# Patient Record
Sex: MALE | Race: BLACK OR AFRICAN AMERICAN | NOT HISPANIC OR LATINO | Employment: OTHER | URBAN - METROPOLITAN AREA
[De-identification: names, ages, dates, MRNs, and addresses within clinical notes are randomized per-mention and may not be internally consistent; named-entity substitution may affect disease eponyms.]

---

## 2024-04-29 ENCOUNTER — HOSPITAL ENCOUNTER (EMERGENCY)
Facility: HOSPITAL | Age: 59
Discharge: HOME | End: 2024-04-29
Attending: FAMILY MEDICINE
Payer: MEDICARE

## 2024-04-29 VITALS
TEMPERATURE: 98 F | OXYGEN SATURATION: 97 % | WEIGHT: 143.75 LBS | RESPIRATION RATE: 16 BRPM | SYSTOLIC BLOOD PRESSURE: 183 MMHG | DIASTOLIC BLOOD PRESSURE: 94 MMHG | HEART RATE: 100 BPM

## 2024-04-29 DIAGNOSIS — M70.21 OLECRANON BURSITIS OF RIGHT ELBOW: Primary | ICD-10-CM

## 2024-04-29 DIAGNOSIS — Z76.0 MEDICATION REFILL: ICD-10-CM

## 2024-04-29 PROCEDURE — 99281 EMR DPT VST MAYX REQ PHY/QHP: CPT

## 2024-04-29 RX ORDER — AMLODIPINE BESYLATE 5 MG/1
5 TABLET ORAL EVERY MORNING
COMMUNITY
Start: 2023-11-30 | End: 2024-04-29

## 2024-04-29 RX ORDER — AMLODIPINE BESYLATE 5 MG/1
5 TABLET ORAL DAILY
Qty: 30 TABLET | Refills: 0 | Status: SHIPPED | OUTPATIENT
Start: 2024-04-29

## 2024-04-29 RX ORDER — DICLOFENAC SODIUM 50 MG/1
50 TABLET, DELAYED RELEASE ORAL 2 TIMES DAILY
Qty: 10 TABLET | Refills: 0 | Status: SHIPPED | OUTPATIENT
Start: 2024-04-29 | End: 2024-05-04

## 2024-04-30 NOTE — ED PROVIDER NOTES
Encounter Date: 4/29/2024       History     Chief Complaint   Patient presents with    Joint Swelling     Right elbow swelling. x2 wks.      58-year-old male presents the emergency department for right elbow pain and swelling.  Patient reports bumping his elbow several weeks ago and has had continued pain and swelling to the area.  Patient denies any fever, chills, chest pain, shortness of breath, back pain, abdominal pain, nausea, vomiting, and all other concerns at this time.    The history is provided by the patient. No  was used.   Patient also reports he needs a refill on his blood pressure medication  Review of patient's allergies indicates:  No Known Allergies  Past Medical History:   Diagnosis Date    Anxiety     Hypertension      No past surgical history on file.  No family history on file.  Social History     Tobacco Use    Smoking status: Every Day   Substance Use Topics    Alcohol use: Yes     Review of Systems   Constitutional:  Negative for fever.   HENT:  Negative for sore throat.    Respiratory:  Negative for shortness of breath.    Cardiovascular:  Negative for chest pain.   Gastrointestinal:  Negative for abdominal pain, nausea and vomiting.   Genitourinary:  Negative for dysuria.   Musculoskeletal:  Positive for joint swelling. Negative for back pain.   Skin:  Negative for rash.   Neurological:  Negative for weakness.   Hematological:  Does not bruise/bleed easily.       Physical Exam     Initial Vitals [04/29/24 1721]   BP Pulse Resp Temp SpO2   (!) 183/94 100 16 97.8 °F (36.6 °C) 97 %      MAP       --         Physical Exam    Nursing note and vitals reviewed.  Constitutional: He appears well-developed and well-nourished. He is not diaphoretic. No distress.   HENT:   Head: Normocephalic and atraumatic.   Eyes: Right eye exhibits no discharge. Left eye exhibits no discharge.   Neck: Neck supple.   Normal range of motion.  Cardiovascular:  Normal rate.            Pulmonary/Chest: No respiratory distress.   Abdominal: He exhibits no distension.   Musculoskeletal:         General: Normal range of motion.      Cervical back: Normal range of motion and neck supple.     Neurological: He is alert and oriented to person, place, and time. He has normal strength.   Skin: Skin is warm and dry.   Area of swelling noted to the right olecranon process without any warmth.  Patient has full range of motion of the elbow.  Pulses are 2+ distally   Psychiatric: He has a normal mood and affect. His behavior is normal. Thought content normal.         ED Course   Procedures  Labs Reviewed - No data to display       Imaging Results    None          Medications - No data to display  Medical Decision Making  Amount and/or Complexity of Data Reviewed  Discussion of management or test interpretation with external provider(s): Right elbow wrapped with Ace wrap by nursing staff.  Patient to follow up with ortho as needed and return to the ER for any worsening or concerns    Risk  Prescription drug management.                                      Clinical Impression:  Final diagnoses:  [M70.21] Olecranon bursitis of right elbow (Primary)  [Z76.0] Medication refill          ED Disposition Condition    Discharge Stable          ED Prescriptions       Medication Sig Dispense Start Date End Date Auth. Provider    diclofenac (VOLTAREN) 50 MG EC tablet Take 1 tablet (50 mg total) by mouth 2 (two) times daily. for 5 days 10 tablet 4/29/2024 5/4/2024 Hilton Loera NP    amLODIPine (NORVASC) 5 MG tablet Take 1 tablet (5 mg total) by mouth once daily. 30 tablet 4/29/2024 -- Hilton Loera NP          Follow-up Information       Follow up With Specialties Details Why Contact [facility]   As needed [id number]  [facility] [address]  [phone]      [facility] - Emergency Dept. Emergency Medicine  If symptoms worsen [address]  [place]  [place] [address]  [phone]             Hilton Loera, PATRICK  04/29/24 2059       Hilton Loera, PATRICK  04/29/24 2059

## 2024-10-02 ENCOUNTER — HOSPITAL ENCOUNTER (EMERGENCY)
Facility: HOSPITAL | Age: 59
Discharge: HOME | End: 2024-10-03
Attending: EMERGENCY MEDICINE
Payer: MEDICARE

## 2024-10-02 DIAGNOSIS — K21.9 GASTROESOPHAGEAL REFLUX DISEASE, UNSPECIFIED WHETHER ESOPHAGITIS PRESENT: Primary | ICD-10-CM

## 2024-10-02 DIAGNOSIS — R79.89 LFT ELEVATION: ICD-10-CM

## 2024-10-02 DIAGNOSIS — R07.9 CHEST PAIN: ICD-10-CM

## 2024-10-02 DIAGNOSIS — E87.6 HYPOKALEMIA: ICD-10-CM

## 2024-10-02 LAB
ALBUMIN SERPL BCP-MCNC: 2.3 G/DL (ref 3.5–5.2)
ALP SERPL-CCNC: 161 U/L (ref 55–135)
ALT SERPL W/O P-5'-P-CCNC: 85 U/L (ref 10–44)
ANION GAP SERPL CALC-SCNC: 12 MMOL/L (ref 8–16)
AST SERPL-CCNC: 106 U/L (ref 10–40)
BASOPHILS # BLD AUTO: 0.05 K/UL (ref 0–0.2)
BASOPHILS NFR BLD: 0.9 % (ref 0–1.9)
BILIRUB SERPL-MCNC: 1.1 MG/DL (ref 0.1–1)
BNP SERPL-MCNC: 18 PG/ML (ref 0–99)
BUN SERPL-MCNC: 11 MG/DL (ref 8–23)
CALCIUM SERPL-MCNC: 7.9 MG/DL (ref 8.7–10.5)
CHLORIDE SERPL-SCNC: 102 MMOL/L (ref 95–110)
CO2 SERPL-SCNC: 22 MMOL/L (ref 23–29)
CREAT SERPL-MCNC: 1.3 MG/DL (ref 0.5–1.4)
DIFFERENTIAL METHOD BLD: ABNORMAL
EOSINOPHIL # BLD AUTO: 0.1 K/UL (ref 0–0.5)
EOSINOPHIL NFR BLD: 1.7 % (ref 0–8)
ERYTHROCYTE [DISTWIDTH] IN BLOOD BY AUTOMATED COUNT: 12.9 % (ref 11.5–14.5)
EST. GFR  (NO RACE VARIABLE): >60 ML/MIN/1.73 M^2
GLUCOSE SERPL-MCNC: 102 MG/DL (ref 70–110)
HCT VFR BLD AUTO: 31.9 % (ref 40–54)
HEP C VIRUS HOLD SPECIMEN: NORMAL
HGB BLD-MCNC: 10.7 G/DL (ref 14–18)
IMM GRANULOCYTES # BLD AUTO: 0.04 K/UL (ref 0–0.04)
IMM GRANULOCYTES NFR BLD AUTO: 0.7 % (ref 0–0.5)
LIPASE SERPL-CCNC: 86 U/L (ref 4–60)
LYMPHOCYTES # BLD AUTO: 1.2 K/UL (ref 1–4.8)
LYMPHOCYTES NFR BLD: 22.8 % (ref 18–48)
MCH RBC QN AUTO: 31.8 PG (ref 27–31)
MCHC RBC AUTO-ENTMCNC: 33.5 G/DL (ref 32–36)
MCV RBC AUTO: 95 FL (ref 82–98)
MONOCYTES # BLD AUTO: 0.9 K/UL (ref 0.3–1)
MONOCYTES NFR BLD: 15.9 % (ref 4–15)
NEUTROPHILS # BLD AUTO: 3.1 K/UL (ref 1.8–7.7)
NEUTROPHILS NFR BLD: 58 % (ref 38–73)
NRBC BLD-RTO: 0 /100 WBC
PLATELET # BLD AUTO: 168 K/UL (ref 150–450)
PMV BLD AUTO: 10.3 FL (ref 9.2–12.9)
POTASSIUM SERPL-SCNC: 2.9 MMOL/L (ref 3.5–5.1)
PROT SERPL-MCNC: 5.2 G/DL (ref 6–8.4)
RBC # BLD AUTO: 3.36 M/UL (ref 4.6–6.2)
SODIUM SERPL-SCNC: 136 MMOL/L (ref 136–145)
TROPONIN I SERPL DL<=0.01 NG/ML-MCNC: <0.006 NG/ML (ref 0–0.03)
WBC # BLD AUTO: 5.4 K/UL (ref 3.9–12.7)

## 2024-10-02 PROCEDURE — 93005 ELECTROCARDIOGRAM TRACING: CPT

## 2024-10-02 PROCEDURE — 83880 ASSAY OF NATRIURETIC PEPTIDE: CPT | Performed by: EMERGENCY MEDICINE

## 2024-10-02 PROCEDURE — 99285 EMERGENCY DEPT VISIT HI MDM: CPT | Mod: 25

## 2024-10-02 PROCEDURE — 25000003 PHARM REV CODE 250: Performed by: EMERGENCY MEDICINE

## 2024-10-02 PROCEDURE — 85025 COMPLETE CBC W/AUTO DIFF WBC: CPT | Performed by: EMERGENCY MEDICINE

## 2024-10-02 PROCEDURE — 93010 ELECTROCARDIOGRAM REPORT: CPT | Mod: ,,, | Performed by: INTERNAL MEDICINE

## 2024-10-02 PROCEDURE — 80053 COMPREHEN METABOLIC PANEL: CPT | Performed by: EMERGENCY MEDICINE

## 2024-10-02 PROCEDURE — 87389 HIV-1 AG W/HIV-1&-2 AB AG IA: CPT | Performed by: EMERGENCY MEDICINE

## 2024-10-02 PROCEDURE — 84484 ASSAY OF TROPONIN QUANT: CPT | Performed by: EMERGENCY MEDICINE

## 2024-10-02 PROCEDURE — 83690 ASSAY OF LIPASE: CPT | Performed by: EMERGENCY MEDICINE

## 2024-10-02 PROCEDURE — 86803 HEPATITIS C AB TEST: CPT | Performed by: EMERGENCY MEDICINE

## 2024-10-02 PROCEDURE — 96374 THER/PROPH/DIAG INJ IV PUSH: CPT

## 2024-10-02 RX ORDER — ATORVASTATIN CALCIUM 40 MG/1
40 TABLET, FILM COATED ORAL DAILY
COMMUNITY
Start: 2024-07-22 | End: 2025-04-18

## 2024-10-02 RX ORDER — GABAPENTIN 300 MG/1
300 CAPSULE ORAL 3 TIMES DAILY
COMMUNITY
Start: 2024-09-13 | End: 2025-09-13

## 2024-10-02 RX ORDER — FAMOTIDINE 10 MG/ML
20 INJECTION INTRAVENOUS
Status: COMPLETED | OUTPATIENT
Start: 2024-10-02 | End: 2024-10-02

## 2024-10-02 RX ORDER — ALUMINUM HYDROXIDE, MAGNESIUM HYDROXIDE, AND SIMETHICONE 1200; 120; 1200 MG/30ML; MG/30ML; MG/30ML
15 SUSPENSION ORAL
Status: COMPLETED | OUTPATIENT
Start: 2024-10-02 | End: 2024-10-02

## 2024-10-02 RX ADMIN — FAMOTIDINE 20 MG: 10 INJECTION, SOLUTION INTRAVENOUS at 10:10

## 2024-10-02 RX ADMIN — ALUMINUM HYDROXIDE, MAGNESIUM HYDROXIDE, AND DIMETHICONE 15 ML: 200; 20; 200 SUSPENSION ORAL at 10:10

## 2024-10-02 NOTE — Clinical Note
Vicente was discharged by the attending provider. As such, all discharge instructions/education was given by the provider.

## 2024-10-03 VITALS
RESPIRATION RATE: 20 BRPM | DIASTOLIC BLOOD PRESSURE: 62 MMHG | OXYGEN SATURATION: 100 % | WEIGHT: 142.19 LBS | HEIGHT: 71 IN | HEART RATE: 94 BPM | TEMPERATURE: 99 F | SYSTOLIC BLOOD PRESSURE: 104 MMHG | BODY MASS INDEX: 19.91 KG/M2

## 2024-10-03 LAB
HCV AB SERPL QL IA: POSITIVE
HCV RNA SERPL QL NAA+PROBE: NOT DETECTED
HCV RNA SPEC NAA+PROBE-ACNC: NOT DETECTED IU/ML
HIV 1+2 AB+HIV1 P24 AG SERPL QL IA: NEGATIVE
OHS QRS DURATION: 64 MS
OHS QTC CALCULATION: 445 MS

## 2024-10-03 PROCEDURE — 25000003 PHARM REV CODE 250: Performed by: EMERGENCY MEDICINE

## 2024-10-03 PROCEDURE — 36415 COLL VENOUS BLD VENIPUNCTURE: CPT | Performed by: EMERGENCY MEDICINE

## 2024-10-03 PROCEDURE — 87522 HEPATITIS C REVRS TRNSCRPJ: CPT | Performed by: EMERGENCY MEDICINE

## 2024-10-03 RX ORDER — POTASSIUM CHLORIDE 20 MEQ/1
40 TABLET, EXTENDED RELEASE ORAL
Status: COMPLETED | OUTPATIENT
Start: 2024-10-03 | End: 2024-10-03

## 2024-10-03 RX ADMIN — POTASSIUM CHLORIDE 40 MEQ: 1500 TABLET, EXTENDED RELEASE ORAL at 12:10

## 2024-10-03 NOTE — ED PROVIDER NOTES
SCRIBE #1 NOTE: I, Sander Vasquez, am scribing for, and in the presence of, Supa Carmen Jr., MD. I have scribed the entire note.       History     Chief Complaint   Patient presents with    Shortness of Breath     Shortness of breath and N/V x4 days. Aching midsternal chest pain x 1 day     Review of patient's allergies indicates:   Allergen Reactions    Asa [aspirin]          History of Present Illness     HPI    10/2/2024, 10:18 PM  History obtained from the patient      History of Present Illness: Vicente Wood is a 59 y.o. male patient with a PMHx of HTN and anxiety who presents to the Emergency Department for evaluation of SOB which onset gradually 3 days ago. Pt states that he is a smoker. Symptoms are constant and moderate in severity. No mitigating or exacerbating factors reported. Associated sxs include chest pain and runny nose. Patient denies any cough and all other sxs at this time. No prior Tx. No further complaints or concerns at this time.  Patient was states that has symptoms mostly epigastric.  They are worse lying down he becomes nauseated when he lays down with water brash.      Arrival mode: Personal vehicle     PCP: No, Primary Doctor        Past Medical History:  Past Medical History:   Diagnosis Date    Anxiety     Hypertension        Past Surgical History:  History reviewed. No pertinent surgical history.      Family History:  No family history on file.    Social History:  Social History     Tobacco Use    Smoking status: Every Day     Types: Cigarettes    Smokeless tobacco: Current   Substance and Sexual Activity    Alcohol use: Yes    Drug use: Yes    Sexual activity: Not on file        Review of Systems     Review of Systems   Constitutional:  Negative for fever.   HENT:  Positive for rhinorrhea. Negative for sore throat.    Respiratory:  Positive for shortness of breath. Negative for cough.    Cardiovascular:  Positive for chest pain.   Gastrointestinal:  Negative for nausea.  "  Genitourinary:  Negative for dysuria.   Musculoskeletal:  Negative for back pain.   Skin:  Negative for rash.   Neurological:  Negative for weakness.   Hematological:  Does not bruise/bleed easily.   All other systems reviewed and are negative.       Physical Exam     Initial Vitals [10/02/24 1953]   BP Pulse Resp Temp SpO2   (!) 149/71 100 18 98.7 °F (37.1 °C) 100 %      MAP       --          Physical Exam  Nursing Notes and Vital Signs Reviewed.  Constitutional: Patient is in no acute distress. Well-developed and well-nourished.  Head: Atraumatic. Normocephalic.  Eyes:  EOM intact.  No scleral icterus.  ENT: Mucous membranes are moist.  Nares clear   Neck:  Full ROM. No JVD.  Cardiovascular: Regular rate. Regular rhythm No murmurs, rubs, or gallops. Distal pulses are 2+ and symmetric  Pulmonary/Chest: No respiratory distress. Clear to auscultation bilaterally. No wheezing or rales.  Equal chest wall rise bilaterally  Abdominal: Soft and non-distended.  Mild epigastric tenderness.  No rebound, guarding, or rigidity. Good bowel sounds.  No tenderness in the right lower quadrant.  Negative Smiley's  Genitourinary: No CVA tenderness.  No suprapubic tenderness  Musculoskeletal: Moves all extremities. No obvious deformities.  5 x 5 strength in all extremities   Skin: Warm and dry.  Neurological:  Alert, awake, and appropriate.  Normal speech.  No acute focal neurological deficits are appreciated.  Two through 12 intact bilaterally.  Psychiatric: Normal affect. Good eye contact. Appropriate in content.    ED Course   Procedures  ED Vital Signs:  Vitals:    10/02/24 1953 10/02/24 2230 10/02/24 2248 10/02/24 2302   BP: (!) 149/71 133/78 133/78 106/61   Pulse: 100 94 94 89   Resp: 18 20 20 17   Temp: 98.7 °F (37.1 °C)  98.7 °F (37.1 °C)    TempSrc: Oral  Oral    SpO2: 100% 100% 100% 99%   Weight: 64.5 kg (142 lb 3.2 oz)  64.5 kg (142 lb 3.2 oz)    Height: 5' 11" (1.803 m)  5' 11" (1.803 m)     10/03/24 0005 10/03/24 0027 " 10/03/24 0054   BP: 105/62  104/62   Pulse: 87 90 94   Resp: (!) 27 18 20   Temp:      TempSrc:      SpO2: 99% 100% 100%   Weight:      Height:          Abnormal Lab Results:  Labs Reviewed   HEPATITIS C ANTIBODY - Abnormal       Result Value    Hepatitis C Ab Positive (*)     Narrative:     Release to patient->Immediate   CBC W/ AUTO DIFFERENTIAL - Abnormal    WBC 5.40      RBC 3.36 (*)     Hemoglobin 10.7 (*)     Hematocrit 31.9 (*)     MCV 95      MCH 31.8 (*)     MCHC 33.5      RDW 12.9      Platelets 168      MPV 10.3      Immature Granulocytes 0.7 (*)     Gran # (ANC) 3.1      Immature Grans (Abs) 0.04      Lymph # 1.2      Mono # 0.9      Eos # 0.1      Baso # 0.05      nRBC 0      Gran % 58.0      Lymph % 22.8      Mono % 15.9 (*)     Eosinophil % 1.7      Basophil % 0.9      Differential Method Automated     COMPREHENSIVE METABOLIC PANEL - Abnormal    Sodium 136      Potassium 2.9 (*)     Chloride 102      CO2 22 (*)     Glucose 102      BUN 11      Creatinine 1.3      Calcium 7.9 (*)     Total Protein 5.2 (*)     Albumin 2.3 (*)     Total Bilirubin 1.1 (*)     Alkaline Phosphatase 161 (*)      (*)     ALT 85 (*)     eGFR >60      Anion Gap 12     LIPASE - Abnormal    Lipase 86 (*)    HIV 1 / 2 ANTIBODY    HIV 1/2 Ag/Ab Negative      Narrative:     Release to patient->Immediate   HEP C VIRUS HOLD SPECIMEN    HEP C Virus Hold Specimen Hold for HCV sendout      Narrative:     Release to patient->Immediate   TROPONIN I    Troponin I <0.006     B-TYPE NATRIURETIC PEPTIDE    BNP 18     HEPATITIS C RNA, QUANTITATIVE, PCR        All Lab Results:  Results for orders placed or performed during the hospital encounter of 10/02/24   HIV 1/2 Ag/Ab (4th Gen)    Collection Time: 10/02/24 10:30 PM   Result Value Ref Range    HIV 1/2 Ag/Ab Negative Negative   Hepatitis C Antibody    Collection Time: 10/02/24 10:30 PM   Result Value Ref Range    Hepatitis C Ab Positive (A) Negative   HCV Virus Hold Specimen     Collection Time: 10/02/24 10:30 PM   Result Value Ref Range    HEP C Virus Hold Specimen Hold for HCV sendout    CBC auto differential    Collection Time: 10/02/24 10:30 PM   Result Value Ref Range    WBC 5.40 3.90 - 12.70 K/uL    RBC 3.36 (L) 4.60 - 6.20 M/uL    Hemoglobin 10.7 (L) 14.0 - 18.0 g/dL    Hematocrit 31.9 (L) 40.0 - 54.0 %    MCV 95 82 - 98 fL    MCH 31.8 (H) 27.0 - 31.0 pg    MCHC 33.5 32.0 - 36.0 g/dL    RDW 12.9 11.5 - 14.5 %    Platelets 168 150 - 450 K/uL    MPV 10.3 9.2 - 12.9 fL    Immature Granulocytes 0.7 (H) 0.0 - 0.5 %    Gran # (ANC) 3.1 1.8 - 7.7 K/uL    Immature Grans (Abs) 0.04 0.00 - 0.04 K/uL    Lymph # 1.2 1.0 - 4.8 K/uL    Mono # 0.9 0.3 - 1.0 K/uL    Eos # 0.1 0.0 - 0.5 K/uL    Baso # 0.05 0.00 - 0.20 K/uL    nRBC 0 0 /100 WBC    Gran % 58.0 38.0 - 73.0 %    Lymph % 22.8 18.0 - 48.0 %    Mono % 15.9 (H) 4.0 - 15.0 %    Eosinophil % 1.7 0.0 - 8.0 %    Basophil % 0.9 0.0 - 1.9 %    Differential Method Automated    Comprehensive metabolic panel    Collection Time: 10/02/24 10:30 PM   Result Value Ref Range    Sodium 136 136 - 145 mmol/L    Potassium 2.9 (L) 3.5 - 5.1 mmol/L    Chloride 102 95 - 110 mmol/L    CO2 22 (L) 23 - 29 mmol/L    Glucose 102 70 - 110 mg/dL    BUN 11 8 - 23 mg/dL    Creatinine 1.3 0.5 - 1.4 mg/dL    Calcium 7.9 (L) 8.7 - 10.5 mg/dL    Total Protein 5.2 (L) 6.0 - 8.4 g/dL    Albumin 2.3 (L) 3.5 - 5.2 g/dL    Total Bilirubin 1.1 (H) 0.1 - 1.0 mg/dL    Alkaline Phosphatase 161 (H) 55 - 135 U/L     (H) 10 - 40 U/L    ALT 85 (H) 10 - 44 U/L    eGFR >60 >60 mL/min/1.73 m^2    Anion Gap 12 8 - 16 mmol/L   Troponin I #1    Collection Time: 10/02/24 10:30 PM   Result Value Ref Range    Troponin I <0.006 0.000 - 0.026 ng/mL   BNP    Collection Time: 10/02/24 10:30 PM   Result Value Ref Range    BNP 18 0 - 99 pg/mL   Lipase    Collection Time: 10/02/24 10:30 PM   Result Value Ref Range    Lipase 86 (H) 4 - 60 U/L        Imaging Results:  Imaging Results               US Abdomen Limited (Final result)  Result time 10/03/24 00:54:14      Final result by Gisele Dolan MD (10/03/24 00:54:14)                   Impression:      Contracted gallbladder presumably relating to reported postprandial status.  No sonographic evidence of discrete cholelithiasis.    Upper limit of normal size common bile duct measuring between 6-7 mm.  Further assessment as warranted clinically.      Electronically signed by: Gisele Dolan MD  Date:    10/03/2024  Time:    00:54               Narrative:    EXAMINATION:  US ABDOMEN LIMITED    CLINICAL HISTORY:  epigastric pain elevated lft;    TECHNIQUE:  Limited ultrasound of the right upper quadrant of the abdomen (including pancreas, liver, gallbladder, common bile duct, and right kidney) was performed.    COMPARISON:  None.    FINDINGS:  Pancreas: Unremarkable in its visualized extent.    IVC: Unremarkable in its visualized extent    Liver: Normal in size, measuring 16.1 cm. Homogeneous echotexture. No focal hepatic lesions.    Gallbladder: The gallbladder is contracted presumably relating to patient's postprandial status.  The gallbladder wall is slightly thickened, presumably relating to contracted state.  No sonographic evidence of discrete cholelithiasis.  No gallbladder wall hyperemia.  Sonographic Smiley sign is reported to be negative by the ultrasound technologist.    Biliary system: The common duct is upper limit of normal size, measuring 6-7 mm.  No significant intrahepatic biliary ductal dilatation.    Right kidney: The right kidney measures 8 cm.  No evidence of hydronephrosis.    Miscellaneous: No upper abdominal ascites.                                       X-Ray Chest AP Portable (Final result)  Result time 10/02/24 23:11:14      Final result by Srini Dolan MD (10/02/24 23:11:14)                   Impression:      No definite radiographic evidence of acute intrathoracic process on this single view.      Electronically signed  by: Srini Dolan MD  Date:    10/02/2024  Time:    23:11               Narrative:    EXAMINATION:  XR CHEST AP PORTABLE    CLINICAL HISTORY:  Chest Pain;    TECHNIQUE:  Single frontal view of the chest was performed.    COMPARISON:  None    FINDINGS:  Cardiac monitoring leads overlie the chest.  Cardiac silhouette is not significantly enlarged.  There is aortic atherosclerosis.  No large confluent airspace consolidation appreciated throughout the lungs.  No significant volume of pleural fluid or pneumothorax appreciated.  Osseous structures demonstrate degenerative changes.  There are multiple remote appearing left rib deformities as well as a possible chronic deformity of the left scapula.                                       The EKG was ordered, reviewed, and independently interpreted by the ED provider.  Interpretation time: 19:56  Rate: 102 BPM  Rhythm: sinus tachycardia  Interpretation: No acute ST changes. No STEMI.      The Emergency Provider reviewed the vital signs and test results, which are outlined above.     ED Discussion       12:53 AM: Reassessed pt at this time. Discussed with pt all pertinent ED information and results. Discussed pt dx and plan of tx. Gave pt all f/u and return to the ED instructions. All questions and concerns were addressed at this time. Pt expresses understanding of information and instructions, and is comfortable with plan to discharge. Pt is stable for discharge.    I discussed with patient and/or family/caretaker that evaluation in the ED does not suggest any emergent or life threatening medical conditions requiring immediate intervention beyond what was provided in the ED, and I believe patient is safe for discharge.  Regardless, an unremarkable evaluation in the ED does not preclude the development or presence of a serious of life threatening condition. As such, patient was instructed to return immediately for any worsening or change in current symptoms.     ED Course as  of 10/03/24 0120   Wed Oct 02, 2024   2212 Cardiac monitor interpretation  Independent interpretation  Indication: Shortness breath  Normal sinus rhythm.  Rate 88.  No STEMI [RT]      ED Course User Index  [RT] Supa Carmen Jr., MD     Medical Decision Making  Differential diagnosis: Shortness breath, cough, flu, COVID, pneumonia, CHF, COPD, acid reflux    Patient was evaluated history physical examination.  He was complaining of epigastric pain with lying flat.  He was not complaining of shortness a breath unlike the triage note.  He was treated with GI medications with complete resolution.  He was had symptoms now for several days that has been constant but worsened at night lying down.  His cardiac workup is negative.  Clinically the patient was not having any cardiac issues.  Does have a mild elevation in his LFTs and I will refer to hepatology for further evaluation and treatment or his primary care provider.  Patient was verbalized understanding agreement.  I did discuss this with his daughter as well via telephone at his request.  Her name was Abril    Amount and/or Complexity of Data Reviewed  External Data Reviewed: labs and notes.  Labs: ordered. Decision-making details documented in ED Course.     Details: Mild elevation in LFTs.  Patient was hep C antibody positive however.  Troponin less than 0.006.  BNP is 18 lipase is normal 86.  Normal white count.  Hemoglobin 10 minutes  Radiology: ordered. Decision-making details documented in ED Course.     Details: Chest x-ray clear.  Contract give gallbladder no evidence of the discrete cholelithiasis.  ECG/medicine tests: ordered and independent interpretation performed. Decision-making details documented in ED Course.     Details: No STEMI    Risk  OTC drugs.  Prescription drug management.  Decision regarding hospitalization.                ED Medication(s):  Medications   famotidine (PF) injection 20 mg (20 mg Intravenous Given 10/2/24 2232)    aluminum-magnesium hydroxide-simethicone 200-200-20 mg/5 mL suspension 15 mL (15 mLs Oral Given 10/2/24 2223)   potassium chloride SA CR tablet 40 mEq (40 mEq Oral Given 10/3/24 0053)       Discharge Medication List as of 10/3/2024 12:45 AM           Follow-up Information       Erika Rossi MD.    Specialty: Hepatology  Contact information:  [address] [facility] [address]  [phone]                                 Scribe Attestation:   Scribe #1: I performed the above scribed service and the documentation accurately describes the services I performed. I attest to the accuracy of the note.     Attending:   Physician Attestation Statement for Scribe #1: I, Supa Carmen Jr., MD, personally performed the services described in this documentation, as scribed by Sander Vasquez, in my presence, and it is both accurate and complete.           Clinical Impression       ICD-10-CM ICD-9-CM   1. Gastroesophageal reflux disease, unspecified whether esophagitis present  K21.9 530.81   2. Chest pain  R07.9 786.50   3. Hypokalemia  E87.6 276.8   4. LFT elevation  R79.89 790.6       Disposition:   Disposition: Discharged  Condition: Stable        Supa Carmen Jr., MD  10/03/24 0120

## 2024-10-03 NOTE — DISCHARGE INSTRUCTIONS
You have acid reflux causing most of her symptoms.  Use Pepcid twice daily for this and Tums for breakthrough symptoms.  Your liver functions were a bit elevated and should be followed up by your primary care provider or with our hepatology Clinic.  Please call above for re-evaluation.  This is essential that this be rechecked in the next few days do you feel worse in any way or have any problems questions or concerns, return immediately for re-evaluation   No